# Patient Record
Sex: FEMALE | Race: WHITE | HISPANIC OR LATINO | Employment: PART TIME | URBAN - NONMETROPOLITAN AREA
[De-identification: names, ages, dates, MRNs, and addresses within clinical notes are randomized per-mention and may not be internally consistent; named-entity substitution may affect disease eponyms.]

---

## 2023-01-17 ENCOUNTER — TELEPHONE (OUTPATIENT)
Dept: FAMILY MEDICINE CLINIC | Facility: CLINIC | Age: 23
End: 2023-01-17
Payer: COMMERCIAL

## 2023-01-17 NOTE — TELEPHONE ENCOUNTER
[facility] READ AND RESCHEDULE  JOSE PATIENT  LVM for patient to call office and reschedule 01/19 appointment. Provider will be out of the office.

## 2023-02-07 ENCOUNTER — OFFICE VISIT (OUTPATIENT)
Dept: FAMILY MEDICINE CLINIC | Facility: CLINIC | Age: 23
End: 2023-02-07
Payer: COMMERCIAL

## 2023-02-07 VITALS
DIASTOLIC BLOOD PRESSURE: 80 MMHG | SYSTOLIC BLOOD PRESSURE: 122 MMHG | OXYGEN SATURATION: 100 % | HEART RATE: 77 BPM | WEIGHT: 114 LBS | BODY MASS INDEX: 22.38 KG/M2 | HEIGHT: 60 IN

## 2023-02-07 DIAGNOSIS — R94.6 ABNORMAL THYROID FUNCTION TEST: Primary | ICD-10-CM

## 2023-02-07 DIAGNOSIS — E55.9 VITAMIN D DEFICIENCY: ICD-10-CM

## 2023-02-07 PROCEDURE — 99202 OFFICE O/P NEW SF 15 MIN: CPT | Performed by: FAMILY MEDICINE

## 2023-02-07 RX ORDER — MAGNESIUM 200 MG
1 TABLET ORAL DAILY
COMMUNITY

## 2023-02-07 RX ORDER — SERTRALINE HYDROCHLORIDE 25 MG/1
1 TABLET, FILM COATED ORAL DAILY
COMMUNITY
Start: 2023-01-26

## 2023-02-07 RX ORDER — CETIRIZINE HYDROCHLORIDE 10 MG/1
10 TABLET ORAL DAILY
COMMUNITY

## 2023-02-07 NOTE — ASSESSMENT & PLAN NOTE
We will repeat labs to ensure this is accurate prior to treating.  She is not symptomatic at this time and thyroid hormone levels are normal  
Will recheck blood work to assess vitamin D supplementation dosing  
room air

## 2023-02-07 NOTE — PROGRESS NOTES
Date: 2023   Patient Name: Ayala Robert  : 2000   MRN: [id number]     Chief Complaint:    Chief Complaint   Patient presents with   • Establish Care          • Thyroid concerns       History of Present Illness: Ayala Robert is a 22 y.o. female who is here today to establish care.  She recently had labs done by her psychiatrist and TSH was mildly elevated at 5.5.  Labs also revealed vitamin D deficiency, she has been taking 4000 IU daily of vitamin D for the past couple of weeks.  She denies any symptoms such as constipation, hair loss, dry skin, or weight gain.  Mom has a history of hypothyroidism.          Review of Systems:   Review of Systems   Constitutional: Negative for chills, fatigue and fever.   Respiratory: Negative for cough and shortness of breath.    Cardiovascular: Negative for chest pain and palpitations.   Gastrointestinal: Negative for abdominal pain, constipation, diarrhea, nausea and vomiting.   Musculoskeletal: Negative for back pain and myalgias.   Neurological: Negative for dizziness and headache.   Psychiatric/Behavioral: Negative for depressed mood. The patient is not nervous/anxious.        Past Medical History: History reviewed. No pertinent past medical history.    Past Surgical History:   Past Surgical History:   Procedure Laterality Date   • DENTAL PROCEDURE     • WISDOM TOOTH EXTRACTION         Family History: History reviewed. No pertinent family history.    Social History:   Social History     Socioeconomic History   • Marital status: Single   Tobacco Use   • Smoking status: Never   • Smokeless tobacco: Never   Vaping Use   • Vaping Use: Never used   Substance and Sexual Activity   • Alcohol use: Not Currently   • Drug use: Never   • Sexual activity: Yes     Partners: Male     Birth control/protection: Condom       Medications:     Current Outpatient Medications:   •  cetirizine (zyrTEC) 10 MG tablet, Take 10 mg by mouth Daily., Disp: , Rfl:   •   Results relayed to patient "Cholecalciferol 100 MCG (4000 UT) capsule, Take 1 capsule by mouth Daily., Disp: , Rfl:   •  Magnesium 200 MG tablet, Take 1 tablet by mouth Daily., Disp: , Rfl:   •  sertraline (ZOLOFT) 25 MG tablet, Take 1 tablet by mouth Daily., Disp: , Rfl:     Allergies:   Allergies   Allergen Reactions   • Penicillins Rash       PHQ-2 Total Score: 1   PHQ-9 Total Score: 1       Physical Exam:  Vital Signs:   Vitals:    02/07/23 0959   BP: 122/80   BP Location: Right arm   Patient Position: Sitting   Cuff Size: Adult   Pulse: 77   SpO2: 100%   Weight: 51.7 kg (114 lb)   Height: 152.4 cm (60\")     Body mass index is 22.26 kg/m².     Physical Exam  Vitals and nursing note reviewed.   Constitutional:       Appearance: Normal appearance.   Neck:      Thyroid: No thyroid mass or thyromegaly.   Cardiovascular:      Rate and Rhythm: Normal rate and regular rhythm.      Heart sounds: Normal heart sounds. No murmur heard.  Pulmonary:      Effort: Pulmonary effort is normal.      Breath sounds: Normal breath sounds. No wheezing.   Abdominal:      General: Bowel sounds are normal.      Palpations: Abdomen is soft.   Neurological:      Mental Status: She is alert and oriented to person, place, and time. Mental status is at baseline.   Psychiatric:         Mood and Affect: Mood normal.         Behavior: Behavior normal.           Assessment/Plan:   Diagnoses and all orders for this visit:    1. Abnormal thyroid function test (Primary)  Assessment & Plan:  We will repeat labs to ensure this is accurate prior to treating.  She is not symptomatic at this time and thyroid hormone levels are normal    Orders:  -     TSH; Future  -     T4, Free; Future  -     TSH  -     T4, Free    2. Vitamin D deficiency  Assessment & Plan:  Will recheck blood work to assess vitamin D supplementation dosing    Orders:  -     Vitamin D,25-Hydroxy; Future  -     Vitamin D,25-Hydroxy         Follow Up:   Return if symptoms worsen or fail to improve.    Carin " DO Colleen  [facility]

## 2023-02-08 LAB
25(OH)D3+25(OH)D2 SERPL-MCNC: 32.1 NG/ML (ref 30–100)
T4 FREE SERPL-MCNC: 1.35 NG/DL (ref 0.82–1.77)
TSH SERPL DL<=0.005 MIU/L-ACNC: 5.38 UIU/ML (ref 0.45–4.5)